# Patient Record
Sex: FEMALE | URBAN - NONMETROPOLITAN AREA
[De-identification: names, ages, dates, MRNs, and addresses within clinical notes are randomized per-mention and may not be internally consistent; named-entity substitution may affect disease eponyms.]

---

## 2020-01-16 ENCOUNTER — APPOINTMENT (RX ONLY)
Dept: URBAN - NONMETROPOLITAN AREA CLINIC 30 | Facility: CLINIC | Age: 20
Setting detail: DERMATOLOGY
End: 2020-01-16

## 2020-01-16 VITALS — WEIGHT: 194 LBS

## 2020-01-16 DIAGNOSIS — L30.8 OTHER SPECIFIED DERMATITIS: ICD-10-CM

## 2020-01-16 PROCEDURE — ? COUNSELING

## 2020-01-16 PROCEDURE — ? PRESCRIPTION

## 2020-01-16 PROCEDURE — 99201: CPT

## 2020-01-16 PROCEDURE — ? TREATMENT REGIMEN

## 2020-01-16 RX ORDER — CLOBETASOL PROPIONATE 0.5 MG/G
1 CREAM TOPICAL BID
Qty: 1 | Refills: 0 | Status: ERX | COMMUNITY
Start: 2020-01-16

## 2020-01-16 RX ADMIN — CLOBETASOL PROPIONATE 1: 0.5 CREAM TOPICAL at 00:00

## 2020-01-16 ASSESSMENT — LOCATION SIMPLE DESCRIPTION DERM: LOCATION SIMPLE: RIGHT SMALL FINGER

## 2020-01-16 ASSESSMENT — LOCATION DETAILED DESCRIPTION DERM: LOCATION DETAILED: RIGHT PROXIMAL RADIAL DORSAL SMALL FINGER

## 2020-01-16 ASSESSMENT — LOCATION ZONE DERM: LOCATION ZONE: FINGER

## 2020-01-16 NOTE — PROCEDURE: TREATMENT REGIMEN
Detail Level: Zone
Otc Regimen: CeraVe moisturizer everyday
Initiate Treatment: Clobetasol cream 2xs a day for two weeks then reduce to 2-3xs weekly
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